# Patient Record
Sex: FEMALE | Race: WHITE
[De-identification: names, ages, dates, MRNs, and addresses within clinical notes are randomized per-mention and may not be internally consistent; named-entity substitution may affect disease eponyms.]

---

## 2019-01-15 ENCOUNTER — HOSPITAL ENCOUNTER (EMERGENCY)
Dept: HOSPITAL 56 - MW.ED | Age: 49
Discharge: HOME | End: 2019-01-15
Payer: COMMERCIAL

## 2019-01-15 VITALS — DIASTOLIC BLOOD PRESSURE: 91 MMHG | SYSTOLIC BLOOD PRESSURE: 152 MMHG

## 2019-01-15 DIAGNOSIS — Z79.899: ICD-10-CM

## 2019-01-15 DIAGNOSIS — Z88.5: ICD-10-CM

## 2019-01-15 DIAGNOSIS — J40: Primary | ICD-10-CM

## 2019-01-15 DIAGNOSIS — Z88.8: ICD-10-CM

## 2019-01-15 NOTE — EDM.PDOC
ED HPI GENERAL MEDICAL PROBLEM





- General


Chief Complaint: Respiratory Problem


Stated Complaint: BROCHITIS/WALKING PNUEMONA


Time Seen by Provider: 01/15/19 12:15


Source of Information: Reports: Patient


History Limitations: Reports: No Limitations





- History of Present Illness


INITIAL COMMENTS - FREE TEXT/NARRATIVE: 





Presents reporting a 10 day history of cough and burning pain in upper chest 

with cough.  She works as a CNA so wanted it checked out.  Did have a flu shot


  ** Chest


Pain Score (Numeric/FACES): 8





- Related Data


 Allergies











Allergy/AdvReac Type Severity Reaction Status Date / Time


 


gluten Allergy  Other Verified 01/15/19 12:05


 


morphine Allergy  Itching Verified 01/15/19 12:05


 


oxycodone [From OxyContin] Allergy  Hallucinati Verified 01/15/19 12:05





   ons  











Home Meds: 


 Home Meds





Calcium/Magnesium/Zinc [Calcium & Magnesium plus Zinc] 1 tab PO DAILY 16 [

History]


Melatonin/Pyridoxine HCl (B6) [Melatonin 5 mg Tablet] 1 each PO BEDTIME  [History]


Multivitamin [Multi-Vitamin Daily] 1 each PO DAILY 16 [History]


Omega-3 Fatty Acids [Omega-3] 100 mg PO DAILY 16 [History]


Budesonide/Formoterol Fumarate [Symbicort 80-4.5 Mcg Inhaler] 2 puff IH BID 7 

Days #1 inhaler 01/15/19 [Rx]


guaiFENesin/Codeine Phosphate [Cheratussin AC Syrup] 10 ml PO 6XDAY PRN #236 

liquid 01/15/19 [Rx]


predniSONE [Prednisone] 2 tab PO DAILY #10 tablet 01/15/19 [Rx]











Past Medical History





- Past Health History


Medical/Surgical History: Denies Medical/Surgical History





- Past Surgical History


Female  Surgical History: Reports:  Section


Musculoskeletal Surgical History: Reports: Other (See Below)


Other Musculoskeletal Surgeries/Procedures:: back surgery





Social & Family History





- Family History


Family Medical History: Noncontributory





- Tobacco Use


Smoking Status *Q: Never Smoker





- Recreational Drug Use


Recreational Drug Use: No





ED ROS GENERAL





- Review of Systems


Review Of Systems: ROS reveals no pertinent complaints other than HPI.





ED EXAM, GENERAL





- Physical Exam


Exam: See Below


Exam Limited By: No Limitations


General Appearance: Alert, No Apparent Distress


Ears: Normal External Exam, Normal Canal, Normal TMs


Nose: Normal Inspection.  No: Nasal Drainage


Throat/Mouth: Normal Inspection, Normal Oropharynx


Head: Atraumatic, Normocephalic


Neck: Normal Inspection.  No: Lymphadenopathy (L), Lymphadenopathy (R)


Respiratory/Chest: No Respiratory Distress, Lungs Clear, Other (dry hacky cough 

in exam room)


Cardiovascular: Regular Rate, Rhythm


GI/Abdominal: Soft





Course





- Vital Signs


Last Recorded V/S: 





 Last Vital Signs











Temp  36.1 C   01/15/19 12:01


 


Pulse  85   01/15/19 12:01


 


Resp  18   01/15/19 12:01


 


BP  152/91 H  01/15/19 12:01


 


Pulse Ox  98   01/15/19 12:01














Departure





- Departure


Time of Disposition: 12:24


Disposition: Home, Self-Care 01


Clinical Impression: 


 Bronchitis








- Discharge Information


*PRESCRIPTION DRUG MONITORING PROGRAM REVIEWED*: No


*COPY OF PRESCRIPTION DRUG MONITORING REPORT IN PATIENT ARIC: No


Prescriptions: 


Budesonide/Formoterol Fumarate [Symbicort 80-4.5 Mcg Inhaler] 2 puff IH BID 7 

Days #1 inhaler


guaiFENesin/Codeine Phosphate [Cheratussin AC Syrup] 10 ml PO 6XDAY PRN #236 

liquid


 PRN Reason: Cough


predniSONE [Prednisone] 2 tab PO DAILY #10 tablet


Referrals: 


PCP,None [Primary Care Provider] - 


Additional Instructions: 


1.  Cough syrup as needed every 4-6 hours.  No driving or operating machinery.


2.  Pulmicort inhaler 2 puffs twice daily next 7 days--start now.


3.  Prednisone 20mg 2 tabs daily x 5 days.


4.  Return for breathing problems, fevers not controlled by Tylenol or other 

worrisome symptoms.

## 2019-01-16 ENCOUNTER — HOSPITAL ENCOUNTER (EMERGENCY)
Dept: HOSPITAL 56 - MW.ED | Age: 49
Discharge: HOME | End: 2019-01-16
Payer: COMMERCIAL

## 2019-01-16 DIAGNOSIS — Z88.5: ICD-10-CM

## 2019-01-16 DIAGNOSIS — Z88.8: ICD-10-CM

## 2019-01-16 DIAGNOSIS — J40: Primary | ICD-10-CM

## 2019-01-16 NOTE — EDM.PDOC
ED HPI GENERAL MEDICAL PROBLEM





- General


Chief Complaint: Respiratory Problem


Stated Complaint: PT HAS BRONCHITIS


Time Seen by Provider: 19 18:36


Source of Information: Reports: Patient


History Limitations: Reports: No Limitations





- History of Present Illness


INITIAL COMMENTS - FREE TEXT/NARRATIVE: 


HISTORY AND PHYSICAL:





History of present illness:


Patient is a 49-year-old female who presents to the emergency room with 

complaints of cough x 2 weeks. She was evaluated in our emergency room 

yesterday and was diagnosed with bronchitis. She was given an inhaler, 

Phenergan with codeine and steroid. She states she has not improved since this 

time and is requesting antibiotics. Patient is a CNA at a nursing home and is 

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 She has subjective fever but has not taken 

the temperature at home. She denies any chest pain, shortness of breath, 

abdominal pain, nausea, vomiting, diarrhea or constipation. She has been able 

to eat and drink appropriately.





Review of systems: 


As per history of present illness and below otherwise all systems reviewed and 

negative.





Past medical history: 


As per history of present illness and as reviewed below otherwise 

noncontributory.





Surgical history: 


As per history of present illness and as reviewed below otherwise 

noncontributory.





Social history: 


See social history for further information





Family history: 


As per history of present illness and as reviewed below otherwise 

noncontributory.





Physical exam:


General: Well-developed and well-nourished 49-year-old female. Alert and 

oriented. Nontoxic appearing and in no acute distress.


HEENT: Atraumatic, normocephalic, pupils equal and reactive bilaterally, 

negative for conjunctival pallor or scleral icterus, mucous membranes moist, 

TMs normal bilaterally, throat clear, neck supple, nontender, trachea midline. 

No drooling or trismus noted. No meningeal signs. No hot potato voice noted. 


Lungs: Clear to auscultation, breath sounds equal bilaterally, chest nontender. 

Dry cough noted.


Heart: S1S2, regular rate and rhythm without overt murmur


Abdomen: Soft, nondistended, nontender. Negative for masses or 

hepatosplenomegaly. Negative for costovertebral tenderness.


Pelvis: Stable nontender.


Genitourinary: Deferred.


Rectal: Deferred.


Skin: Intact, warm, dry. No lesions or rashes noted.


Extremities: Atraumatic, negative for cords or calf pain. Neurovascular 

unremarkable.


Neuro: Awake, alert, oriented. Cranial nerves II through XII unremarkable. 

Cerebellum unremarkable. Motor and sensory unremarkable throughout. Exam 

nonfocal.





Diagnostics:


None





Therapeutics:


None





Prescription:


Pauline





Impression: 


Bronchitis





Plan:


1. Take the medications that you had been previously prescribed.


2. Tylenol and/or ibuprofen as needed for pain and fever management. Increase 

your oral fluids.


3. Follow-up with your primary caregiver in the next 1-2 days. Return to the ED 

as needed and as discussed.





Definitive disposition and diagnosis as appropriate pending reevaluation and 

review of above.





  ** "Lung Pain"


Pain Score (Numeric/FACES): 7





- Related Data


 Allergies











Allergy/AdvReac Type Severity Reaction Status Date / Time


 


gluten Allergy  Other Verified 19 18:36


 


morphine Allergy  Itching Verified 19 18:36


 


oxycodone [From OxyContin] Allergy  Hallucinati Verified 19 18:36





   ons  











Home Meds: 


 Home Meds





Calcium/Magnesium/Zinc [Calcium & Magnesium plus Zinc] 1 tab PO DAILY 16 [

History]


Melatonin/Pyridoxine HCl (B6) [Melatonin 5 mg Tablet] 1 each PO BEDTIME  [History]


Multivitamin [Multi-Vitamin Daily] 1 each PO DAILY 16 [History]


Omega-3 Fatty Acids [Omega-3] 100 mg PO DAILY 16 [History]


Budesonide/Formoterol Fumarate [Symbicort 80-4.5 Mcg Inhaler] 2 puff IH BID 7 

Days #1 inhaler 01/15/19 [Rx]


guaiFENesin/Codeine Phosphate [Cheratussin AC Syrup] 10 ml PO 6XDAY PRN #236 

liquid 01/15/19 [Rx]


predniSONE [Prednisone] 2 tab PO DAILY #10 tablet 01/15/19 [Rx]











Past Medical History





- Past Health History


Medical/Surgical History: Denies Medical/Surgical History





- Past Surgical History


Female  Surgical History: Reports:  Section


Musculoskeletal Surgical History: Reports: Other (See Below)


Other Musculoskeletal Surgeries/Procedures:: back surgery





Social & Family History





- Family History


Family Medical History: Noncontributory





ED ROS GENERAL





- Review of Systems


Review Of Systems: ROS reveals no pertinent complaints other than HPI.





ED EXAM, GENERAL





- Physical Exam


Exam: See Below (See dictation)





Course





- Vital Signs


Last Recorded V/S: 


 Last Vital Signs











Temp  97.3 F   19 18:38


 


Pulse  93   19 18:38


 


Resp  18   19 18:38


 


BP  156/90 H  19 18:38


 


Pulse Ox  96   19 18:38














Departure





- Departure


Time of Disposition: 18:55


Disposition: Home, Self-Care 01


Clinical Impression: 


 Bronchitis








- Discharge Information


Referrals: 


PCP,None [Primary Care Provider] - 


Forms:  ED Department Discharge


Additional Instructions: 


The following information is given to patients seen in the emergency department 

who are being discharged to home. This information is to outline your options 

for follow-up care. We provide all patients seen in our emergency department 

with a follow-up referral.





The need for follow-up, as well as the timing and circumstances, are variable 

depending upon the specifics of your emergency department visit.





If you don't have a primary care physician on staff, we will provide you with a 

referral. We always advise you to contact your personal physician following an 

emergency department visit to inform them of the circumstance of the visit and 

for follow-up with them and/or the need for any referrals to a consulting 

specialist.





The emergency department will also refer you to a specialist when appropriate. 

This referral assures that you have the opportunity for follow-up care with a 

specialist. All of these measure are taken in an effort to provide you with 

optimal care, which includes your follow-up.





Under all circumstances we always encourage you to contact your private 

physician who remains a resource for coordinating your care. When calling for 

follow-up care, please make the office aware that this follow-up is from your 

recent emergency room visit. If for any reason you are refused follow-up, 

please contact the [facility] Emergency 

Department at [phone] and asked to speak to the emergency department 

charge nurse.





[facility]


Primary Care


[address] [place] [address]


Phone: [phone]


Fax: [phone]





[facility]


[address] [place] [address]


Phone: [phone]


Fax: [phone]





1. Take the medications that you had been previously prescribed.


2. Tylenol and/or ibuprofen as needed for pain and fever management. Increase 

your oral fluids.


3. Follow-up with your primary caregiver in the next 1-2 days. Return to the ED 

as needed and as discussed.

## 2019-01-17 VITALS — DIASTOLIC BLOOD PRESSURE: 80 MMHG | SYSTOLIC BLOOD PRESSURE: 144 MMHG

## 2022-07-06 ENCOUNTER — HOSPITAL ENCOUNTER (OUTPATIENT)
Dept: HOSPITAL 56 - MW.SDS | Age: 52
Discharge: HOME | End: 2022-07-06
Attending: SURGERY
Payer: COMMERCIAL

## 2022-07-06 VITALS — DIASTOLIC BLOOD PRESSURE: 58 MMHG | SYSTOLIC BLOOD PRESSURE: 95 MMHG | HEART RATE: 54 BPM

## 2022-07-06 DIAGNOSIS — Z80.0: ICD-10-CM

## 2022-07-06 DIAGNOSIS — Z12.11: Primary | ICD-10-CM

## 2022-07-06 DIAGNOSIS — Z98.890: ICD-10-CM

## 2022-07-06 DIAGNOSIS — Z79.899: ICD-10-CM

## 2022-07-06 DIAGNOSIS — K57.30: ICD-10-CM

## 2022-07-06 DIAGNOSIS — Z88.5: ICD-10-CM

## 2022-07-06 DIAGNOSIS — K64.8: ICD-10-CM

## 2022-07-06 PROCEDURE — 45378 DIAGNOSTIC COLONOSCOPY: CPT
